# Patient Record
Sex: FEMALE | Race: WHITE
[De-identification: names, ages, dates, MRNs, and addresses within clinical notes are randomized per-mention and may not be internally consistent; named-entity substitution may affect disease eponyms.]

---

## 2017-11-11 ENCOUNTER — HOSPITAL ENCOUNTER (EMERGENCY)
Dept: HOSPITAL 31 - C.ER | Age: 42
LOS: 1 days | Discharge: HOME | End: 2017-11-12
Payer: COMMERCIAL

## 2017-11-11 VITALS — HEART RATE: 80 BPM | SYSTOLIC BLOOD PRESSURE: 120 MMHG | DIASTOLIC BLOOD PRESSURE: 80 MMHG

## 2017-11-11 DIAGNOSIS — R42: ICD-10-CM

## 2017-11-11 DIAGNOSIS — H83.09: Primary | ICD-10-CM

## 2017-11-11 LAB
BASOPHILS # BLD AUTO: 0.1 K/UL (ref 0–0.2)
BASOPHILS NFR BLD: 1.4 % (ref 0–2)
BUN SERPL-MCNC: 11 MG/DL (ref 7–17)
CALCIUM SERPL-MCNC: 8.3 MG/DL (ref 8.6–10.4)
CHLORIDE SERPL-SCNC: 101 MMOL/L (ref 98–107)
CO2 SERPL-SCNC: 24 MMOL/L (ref 22–30)
EOSINOPHIL # BLD AUTO: 0.4 K/UL (ref 0–0.7)
EOSINOPHIL NFR BLD: 3.5 % (ref 0–4)
ERYTHROCYTE [DISTWIDTH] IN BLOOD BY AUTOMATED COUNT: 14 % (ref 11.5–14.5)
GLUCOSE SERPL-MCNC: 81 MG/DL (ref 65–105)
HCT VFR BLD CALC: 41.4 % (ref 34–47)
LYMPHOCYTES # BLD AUTO: 3 K/UL (ref 1–4.3)
LYMPHOCYTES NFR BLD AUTO: 28.7 % (ref 20–40)
MCH RBC QN AUTO: 30.2 PG (ref 27–31)
MCHC RBC AUTO-ENTMCNC: 34.3 G/DL (ref 33–37)
MCV RBC AUTO: 88.1 FL (ref 81–99)
MONOCYTES # BLD: 0.9 K/UL (ref 0–0.8)
MONOCYTES NFR BLD: 8.8 % (ref 0–10)
NRBC BLD AUTO-RTO: 0 % (ref 0–2)
PLATELET # BLD: 317 K/UL (ref 130–400)
PMV BLD AUTO: 9.5 FL (ref 7.2–11.7)
POTASSIUM SERPL-SCNC: 3.6 MMOL/L (ref 3.6–5.2)
SODIUM SERPL-SCNC: 134 MMOL/L (ref 132–148)
WBC # BLD AUTO: 10.6 K/UL (ref 4.8–10.8)

## 2017-11-11 PROCEDURE — 96361 HYDRATE IV INFUSION ADD-ON: CPT

## 2017-11-11 PROCEDURE — 85025 COMPLETE CBC W/AUTO DIFF WBC: CPT

## 2017-11-11 PROCEDURE — 81001 URINALYSIS AUTO W/SCOPE: CPT

## 2017-11-11 PROCEDURE — 70450 CT HEAD/BRAIN W/O DYE: CPT

## 2017-11-11 PROCEDURE — 84703 CHORIONIC GONADOTROPIN ASSAY: CPT

## 2017-11-11 PROCEDURE — 99285 EMERGENCY DEPT VISIT HI MDM: CPT

## 2017-11-11 PROCEDURE — 80048 BASIC METABOLIC PNL TOTAL CA: CPT

## 2017-11-11 PROCEDURE — 96374 THER/PROPH/DIAG INJ IV PUSH: CPT

## 2017-11-11 NOTE — C.PDOC
History Of Present Illness


Patient presents to the ER with a complaint of dizziness, nausea, and vomiting 

that worsens with movement. Patient states she was watching TV and got really 

dizzy; she reports it has been worsening over the last day and a half. Denies 

trauma, slurred speech, change in vision, or tinnitus. 


Time Seen by Provider: 11/11/17 22:52


Chief Complaint (Nursing): Dizziness/Lightheaded


History Per: Patient


History/Exam Limitations: no limitations


Onset/Duration Of Symptoms: Days


Current Symptoms Are (Timing): Still Present


Activity At Onset Of Symptoms: Other (Watching TV)


Seizure Or Post-ictal Symptoms: None


Possible Causative Factor(s): Other (Not known)


Fall Associated With With Symptoms: No


Severity: Mild


Pain Scale Rating Of: 4


Recent travel outside of the [place]: No





- Symptoms Of CVA


Associated Symptoms: denies: Impaired Speech, Seizure Activity, New Vision 

Deficit(Left), New Vision Deficit(Right), Decreased Ability To Walk, New 

Confusion


Recent Head Trauma: No





Past Medical History


Reviewed: Historical Data, Nursing Documentation, Vital Signs


Vital Signs: 


 Last Vital Signs











Temp  97.9 F   11/11/17 22:33


 


Pulse  80   11/11/17 22:33


 


Resp  14   11/11/17 22:33


 


BP  120/80   11/11/17 22:33


 


Pulse Ox  99   11/11/17 23:12














- Medical History


PMH: Sexually Transmitted Disease (chlamydia)


Surgical History: No Surg Hx


Family History: States: Unknown Family Hx, CAD





- Social History


Hx Alcohol Use: Yes


Hx Substance Use: No





- Immunization History


Hx Tetanus Toxoid Vaccination: No


Hx Influenza Vaccination: No


Hx Pneumococcal Vaccination: No





Review Of Systems


Constitutional: Negative for: Fever, Chills


Eyes: Negative for: Vision Change


ENT: Negative for: Other (Tinnitus)


Gastrointestinal: Negative for: Nausea, Vomiting


Neurological: Positive for: Dizziness.  Negative for: Change in Speech





Physical Exam





- Physical Exam


Appears: Non-toxic, No Acute Distress


Skin: Warm, Dry


Head: Normacephalic


Eye(s): bilateral: Normal Inspection (No nystagmus), PERRL, EOMI


Ear(s): Bilateral: Normal


Oral Mucosa: Moist


Chest: Symmetrical, No Tenderness


Cardiovascular: Rhythm Regular


Respiratory: No Rales, No Rhonchi, No Wheezing


Gastrointestinal/Abdominal: Soft, No Tenderness


Neurological/Psych: Oriented x3, Other (No focal deficits)





ED Course And Treatment





- Laboratory Results


Result Diagrams: 


 11/11/17 23:14





 11/11/17 23:14


O2 Sat by Pulse Oximetry: 99 (Room air)


Pulse Ox Interpretation: Normal


Progress Note: CT head, blood work, and urinalysis ordered. Antivert and zofran 

administered.





Medical Decision Making


Medical Decision Making: 





Upon provider reevaluation patient is feeling better, is medically stable, and 

requires no further treatment in the ED at this time. Patient will be 

discharged home with Rx for  Antivert and Zofran. Counseling was provided and 

all questions were answered regarding diagnosis and need for follow up with the 

referred clinic. There is agreement to discharge plan. Return if symptoms 

persist or worsen.





Disposition


Counseled Patient/Family Regarding: Studies Performed, Diagnosis, Need For 

Followup, Rx Given





- Disposition


Referrals: 


[facility] [Outside]


[facility] [Outside]


Tawny Brown MD [Staff Provider] - 


Disposition: HOME/ ROUTINE


Disposition Time: 22:52


Condition: FAIR


Additional Instructions: 


Please return of symptoms recur


Prescriptions: 


Meclizine [Antivert] 25 mg PO TID #30 tab


Ondansetron ODT [Zofran ODT] 1 odt PO BID PRN #10 odt


 PRN Reason: Nausea/Vomiting


Instructions:  Labyrinthitis (ED), Dizziness (ED)


Forms:  [facility] (English)





- Clinical Impression


Clinical Impression: 


 Dizziness, Labyrinthitis








- Scribe Statement


The provider has reviewed the documentation as recorded by the Scribmary anne Yates





All medical record entries made by the Scribe were at my direction and 

personally dictated by me. I have reviewed the chart and agree that the record 

accurately reflects my personal performance of the history, physical exam, 

medical decision making, and the department course for this patient. I have 

also personally directed, reviewed, and agree with the discharge instructions 

and disposition.

## 2017-11-12 VITALS — OXYGEN SATURATION: 98 % | RESPIRATION RATE: 20 BRPM | TEMPERATURE: 98 F

## 2017-11-12 LAB
BILIRUB UR-MCNC: NEGATIVE MG/DL
GLUCOSE UR STRIP-MCNC: NORMAL MG/DL
KETONES UR STRIP-MCNC: NEGATIVE MG/DL
LEUKOCYTE ESTERASE UR-ACNC: (no result) LEU/UL
PH UR STRIP: 8 [PH] (ref 5–8)
PROT UR STRIP-MCNC: (no result) MG/DL
RBC # UR STRIP: (no result) /UL
RBC #/AREA URNS HPF: 980 /HPF (ref 0–3)
SP GR UR STRIP: 1.01 (ref 1–1.03)
UROBILINOGEN UR-MCNC: NORMAL MG/DL (ref 0.2–1)
WBC #/AREA URNS HPF: 2 /HPF (ref 0–5)

## 2017-11-12 NOTE — CT
PROCEDURE:  CT HEAD WITHOUT CONTRAST.



HISTORY:

Dizziness 



COMPARISON:

None available. 



TECHNIQUE:

Axial computed tomography images were obtained through the head/brain 

without intravenous contrast.  



Radiation dose:



Total exam DLP = 857.96 mGy-cm.



This CT exam was performed using one or more of the following dose 

reduction techniques: Automated exposure control, adjustment of the 

mA and/or kV according to patient size, and/or use of iterative 

reconstruction technique.



FINDINGS:



HEMORRHAGE:

No intracranial hemorrhage. 



BRAIN:

Gray-white matter differentiation is preserved.  There is no mass, 

mass effect or abnormal extra-axial fluid collection.



VENTRICLES:

The ventricles are normal in size, shape and configuration.



CALVARIUM:

There is no calvarial fracture or extracranial soft tissue swelling.



PARANASAL SINUSES:

Predominantly clear.



MASTOID AIR CELLS:

Predominantly clear.



OTHER FINDINGS:

None.



IMPRESSION:

No acute intracranial abnormality.  



 A preliminary report was provided by [employer].

## 2018-10-19 ENCOUNTER — HOSPITAL ENCOUNTER (EMERGENCY)
Dept: HOSPITAL 31 - C.ER | Age: 43
Discharge: HOME | End: 2018-10-19
Payer: SELF-PAY

## 2018-10-19 VITALS
SYSTOLIC BLOOD PRESSURE: 114 MMHG | RESPIRATION RATE: 18 BRPM | TEMPERATURE: 98.7 F | HEART RATE: 82 BPM | DIASTOLIC BLOOD PRESSURE: 82 MMHG | OXYGEN SATURATION: 100 %

## 2018-10-19 DIAGNOSIS — N39.0: Primary | ICD-10-CM

## 2018-10-19 LAB
BACTERIA #/AREA URNS HPF: (no result) /[HPF]
BILIRUB UR-MCNC: NEGATIVE MG/DL
GLUCOSE UR STRIP-MCNC: NORMAL MG/DL
HCG,QUALITATIVE URINE: NEGATIVE
LEUKOCYTE ESTERASE UR-ACNC: (no result) LEU/UL
PH UR STRIP: 8 [PH] (ref 5–8)
PROT UR STRIP-MCNC: (no result) MG/DL
RBC # UR STRIP: (no result) /UL
SP GR UR STRIP: 1.02 (ref 1–1.03)
SQUAMOUS EPITHIAL: 12 /HPF (ref 0–5)
UROBILINOGEN UR-MCNC: NORMAL MG/DL (ref 0.2–1)

## 2018-10-19 NOTE — C.PDOC
History Of Present Illness


43 year old female, with no significant past medical history, presents to ED for

evaluation of urinary frequency and pain on urination that developed gradually 

over the last 4-5 days. Pt reports taking over the counter Pyridium without 

improvement. Otherwise, denies fever, chills, abdominal pain, n/v/d, back pain, 

hematuria, or vaginal discharge. 





Time Seen by Provider: 10/19/18 17:57


Chief Complaint (Nursing): Female Genitourinary


History Per: Patient


History/Exam Limitations: no limitations


Onset/Duration Of Symptoms: Days


Current Symptoms Are (Timing): Still Present


Quality Of Discomfort: "Pain"


Associated Symptoms: Urinary Symptoms.  denies: Fever, Chills, Nausea, Vomiting,

Diarrhea, Loss Of Appetite, Back Pain, Chest Pain, Constipation


Alleviating Factors: None


Recent travel outside of the [place]: No


Additional History Per: Patient


Abnormal Vaginal Bleeding: No





Past Medical History


Reviewed: Historical Data, Nursing Documentation, Vital Signs


Vital Signs: 





                                Last Vital Signs











Temp  98.7 F   10/19/18 18:03


 


Pulse  82   10/19/18 18:03


 


Resp  18   10/19/18 18:03


 


BP  114/82   10/19/18 18:03


 


Pulse Ox  100   10/19/18 18:03














- Medical History


PMH: Sexually Transmitted Disease (chlamydia)


   Denies: Chronic Kidney Disease


Family History: States: Unknown Family Hx, CAD





- Social History


Hx Alcohol Use: Yes


Hx Substance Use: No





- Immunization History


Hx Tetanus Toxoid Vaccination: No


Hx Influenza Vaccination: No


Hx Pneumococcal Vaccination: No





Review Of Systems


Except As Marked, All Systems Reviewed And Found Negative.


Constitutional: Negative for: Fever, Chills


Gastrointestinal: Negative for: Nausea, Vomiting, Abdominal Pain, Diarrhea


Genitourinary: Positive for: Dysuria, Frequency.  Negative for: Hematuria, 

Vaginal Discharge, Vaginal Bleeding, Pelvic Pain


Musculoskeletal: Negative for: Back Pain





Physical Exam





- Physical Exam


Appears: Well, Non-toxic, No Acute Distress


Skin: Normal Color, Warm, Dry, No Rash


Head: Normacephalic


Eye(s): bilateral: PERRL


Nose: No Flaring, No Discharge


Oral Mucosa: Moist


Throat: No Erythema


Neck: Supple


Cardiovascular: Rhythm Regular, No Murmur, No JVD


Respiratory: No Decreased Breath Sounds, No Accessory Muscle Use, No Stridor, No

Wheezing


Gastrointestinal/Abdominal: Soft, Tenderness (mild suprapubic tenderness), No 

Distention, No Guarding


Back: No CVA Tenderness


Extremity: Normal ROM, No Deformity, No Swelling


Neurological/Psych: Oriented x3, Normal Speech





ED Course And Treatment





- Laboratory Results


Urine Pregnancy POC: Negative


O2 Sat by Pulse Oximetry: 100


Pulse Ox Interpretation: Normal


Progress Note: On re-eval, pt is afebrile, hemodynamicaly stable.  non-toxic, 

tolerate Po well in ED.  ENT: no acute findings.  neck: Supple, (-) meningeal 

sign.  Lungs: CTA B/L, BS equal B/L.  Abd: benign, (-) guarding, (-) rebound.  

back: (-) CVA tenderness.  Neurologicaly intacxt.  UA  (+) nitrate.  Ucx- 

pending.  Pt has clinical findings c/w UTI.  Pt advised and ref. to f/u with PMD

in 2-3 days for re-eval.  return if any new changes.





Disposition


Counseled Patient/Family Regarding: Studies Performed, Diagnosis, Need For 

Followup, Rx Given





- Disposition


Referrals: 


[facility] [Outside]


Disposition: HOME/ ROUTINE


Disposition Time: 18:43


Condition: STABLE


Additional Instructions: 


Encourage fluids


Take medication as prescribed


Cranberry supplement daily


Follow up with PMD in 2-3 days for re-evaluation.


Return to ED at any time if any worsening or new changes.


Prescriptions: 


Cranberry 500 mg PO BID #60 capsule


Nitrofurantoin Macrocrystals [Macrobid] 1 cap PO BID #14 cap


Instructions:  Urinary Tract Infections in Adults


Forms:  [facility] (English)





- Clinical Impression


Clinical Impression: 


 UTI (urinary tract infection)








- PA / NP / Resident Statement


MD/DO has reviewed & agrees with the documentation as recorded.





- Scribe Statement


The provider has reviewed the documentation as recorded by the Bonnie Edmondson





All medical record entries made by the Bonnie were at my direction and 

personally dictated by me. I have reviewed the chart and agree that the record 

accurately reflects my personal performance of the history, physical exam, 

medical decision making, and the department course for this patient. I have also

 personally directed, reviewed, and agree with the discharge instructions and 

disposition.